# Patient Record
Sex: MALE | Race: WHITE
[De-identification: names, ages, dates, MRNs, and addresses within clinical notes are randomized per-mention and may not be internally consistent; named-entity substitution may affect disease eponyms.]

---

## 2017-07-13 ENCOUNTER — HOSPITAL ENCOUNTER (OUTPATIENT)
Dept: HOSPITAL 39 - GMAB | Age: 52
Discharge: HOME | End: 2017-07-13
Attending: FAMILY MEDICINE
Payer: COMMERCIAL

## 2017-07-13 DIAGNOSIS — Z00.01: Primary | ICD-10-CM

## 2018-07-03 ENCOUNTER — HOSPITAL ENCOUNTER (OUTPATIENT)
Dept: HOSPITAL 39 - GMAE | Age: 53
End: 2018-07-03
Attending: FAMILY MEDICINE
Payer: COMMERCIAL

## 2018-07-03 DIAGNOSIS — Z00.01: Primary | ICD-10-CM

## 2018-09-24 ENCOUNTER — HOSPITAL ENCOUNTER (OUTPATIENT)
Dept: HOSPITAL 39 - CT | Age: 53
End: 2018-09-24
Attending: PHYSICIAN ASSISTANT
Payer: COMMERCIAL

## 2018-09-24 DIAGNOSIS — N20.0: Primary | ICD-10-CM

## 2018-09-24 DIAGNOSIS — K80.20: ICD-10-CM

## 2018-09-24 DIAGNOSIS — R10.84: ICD-10-CM

## 2018-09-24 NOTE — CT
EXAM: Abdomen/Pelvis w/wo Contrast 



CLINICAL INDICATION: Abdominal pain.



COMPARISON: There is no previous study for comparison. 



TECHNIQUE: The CT scan was done using contiguous axial 5 mm pre

and postcontrast and delayed sections through the abdomen and

pelvis including IV contrast. This exam was performed according

to our departmental dose-optimization program, which includes

automated exposure control, adjustment of the mA and/or kV

according to patient size and/or use of iterative reconstruction

technique.



FINDINGS: The visualized portions of the lung bases contain areas

of subsegmental atelectasis but are otherwise clear. Review of

precontrast images reveals a 3 mm nonobstructing stone in the

left kidney and another 1.5 mm nonobstructing stone within the

mid left kidney. No ureteral stone or hydronephrosis is seen on

either side. The liver, adrenal glands, spleen, and pancreas are

unremarkable. Tiny cysts are noted in both kidneys. The kidneys

are otherwise unremarkable. Gallstones are noted in the

gallbladder.



The aorta is normal in caliber. The appendix is normal. There are

no dilated loops of small bowel. Mild thickened appearance of the

urinary bladder is noted. There is no free air, free fluid, or

abscess.



IMPRESSION: 

1. Thickened appearance of the urinary bladder which may indicate

cystitis.

2. Two small nonobstructing stones in the left kidney.

3. No acute intra-abdominal process.

4. Cholelithiasis.

 



Electronically signed by:  Anson Rivera MD  9/24/2018 6:18 PM CDT

Workstation: [id number]

## 2019-07-02 ENCOUNTER — HOSPITAL ENCOUNTER (OUTPATIENT)
Dept: HOSPITAL 39 - GMAE | Age: 54
End: 2019-07-02
Attending: FAMILY MEDICINE
Payer: COMMERCIAL

## 2019-07-02 DIAGNOSIS — Z00.01: Primary | ICD-10-CM

## 2020-07-06 ENCOUNTER — HOSPITAL ENCOUNTER (OUTPATIENT)
Dept: HOSPITAL 39 - GMAE | Age: 55
End: 2020-07-06
Attending: FAMILY MEDICINE
Payer: COMMERCIAL

## 2020-07-06 DIAGNOSIS — Z12.5: Primary | ICD-10-CM

## 2020-07-06 DIAGNOSIS — M10.9: ICD-10-CM

## 2020-07-06 DIAGNOSIS — Z13.29: ICD-10-CM

## 2020-07-06 DIAGNOSIS — I10: ICD-10-CM

## 2020-07-06 DIAGNOSIS — E78.2: ICD-10-CM
